# Patient Record
Sex: FEMALE | Race: WHITE
[De-identification: names, ages, dates, MRNs, and addresses within clinical notes are randomized per-mention and may not be internally consistent; named-entity substitution may affect disease eponyms.]

---

## 2020-07-02 ENCOUNTER — HOSPITAL ENCOUNTER (OUTPATIENT)
Dept: HOSPITAL 53 - M WHC | Age: 30
End: 2020-07-02
Attending: OBSTETRICS & GYNECOLOGY
Payer: COMMERCIAL

## 2020-07-02 DIAGNOSIS — Z3A.22: ICD-10-CM

## 2020-07-02 DIAGNOSIS — Z34.82: Primary | ICD-10-CM

## 2020-07-02 NOTE — REP
Clinical:  Limited examination performed for cervical length.

 

Comparison: None .

 

Findings:

Examination demonstrates a single live intrauterine pregnancy in transverse (head

to maternal left) presentation.  Fetal motion is identified by technologist.

Placenta is noted left lateral and grade zero without evidence for placenta

previa or abruption.  Amniotic fluid volume is normal.  Cervix measures 3.5 cm on

transabdominal imaging.

 

Gestational age by LMP 25 weeks 5 days with UBALDO 10/20/2020 .

FHR equals 161 beats per minute.

 

 

Impression:

Transabdominal images demonstrate cervix to measure 3.5 cm in length and appears

closed.